# Patient Record
Sex: FEMALE | Race: WHITE | URBAN - METROPOLITAN AREA
[De-identification: names, ages, dates, MRNs, and addresses within clinical notes are randomized per-mention and may not be internally consistent; named-entity substitution may affect disease eponyms.]

---

## 2017-01-25 ENCOUNTER — TELEPHONE (OUTPATIENT)
Dept: OTHER | Facility: CLINIC | Age: 47
End: 2017-01-25

## 2017-01-25 NOTE — TELEPHONE ENCOUNTER
1/25/2017    Call Regarding [facility] [employer] [facility]    Attempt 1    Message on voicemail     Comments:         Outreach   cnt

## 2017-01-31 NOTE — TELEPHONE ENCOUNTER
Call Regarding [employer] [employer]    Attempt 1    Message     Comments: Per patient's number is [facility]'s number.      Outreach   Priya Ortiz

## 2018-03-07 ENCOUNTER — TELEPHONE (OUTPATIENT)
Dept: OTHER | Facility: CLINIC | Age: 48
End: 2018-03-07

## 2021-02-10 ENCOUNTER — OFFICE VISIT (OUTPATIENT)
Dept: URGENT CARE | Facility: URGENT CARE | Age: 51
End: 2021-02-10
Payer: COMMERCIAL

## 2021-02-10 VITALS
TEMPERATURE: 98.4 F | RESPIRATION RATE: 16 BRPM | HEART RATE: 90 BPM | DIASTOLIC BLOOD PRESSURE: 94 MMHG | WEIGHT: 164 LBS | OXYGEN SATURATION: 97 % | SYSTOLIC BLOOD PRESSURE: 166 MMHG

## 2021-02-10 DIAGNOSIS — R30.0 DYSURIA: Primary | ICD-10-CM

## 2021-02-10 DIAGNOSIS — R03.0 ELEVATED BLOOD PRESSURE READING WITHOUT DIAGNOSIS OF HYPERTENSION: ICD-10-CM

## 2021-02-10 DIAGNOSIS — N76.0 BV (BACTERIAL VAGINOSIS): ICD-10-CM

## 2021-02-10 DIAGNOSIS — B96.89 BV (BACTERIAL VAGINOSIS): ICD-10-CM

## 2021-02-10 LAB
ALBUMIN UR-MCNC: NEGATIVE MG/DL
APPEARANCE UR: CLEAR
BACTERIA #/AREA URNS HPF: ABNORMAL /HPF
BILIRUB UR QL STRIP: NEGATIVE
COLOR UR AUTO: YELLOW
GLUCOSE UR STRIP-MCNC: NEGATIVE MG/DL
HGB UR QL STRIP: ABNORMAL
KETONES UR STRIP-MCNC: NEGATIVE MG/DL
LEUKOCYTE ESTERASE UR QL STRIP: NEGATIVE
NITRATE UR QL: NEGATIVE
NON-SQ EPI CELLS #/AREA URNS LPF: ABNORMAL /LPF
PH UR STRIP: 6 PH (ref 5–7)
RBC #/AREA URNS AUTO: ABNORMAL /HPF
SOURCE: ABNORMAL
SP GR UR STRIP: >1.03 (ref 1–1.03)
SPECIMEN SOURCE: ABNORMAL
UROBILINOGEN UR STRIP-ACNC: 0.2 EU/DL (ref 0.2–1)
WBC #/AREA URNS AUTO: ABNORMAL /HPF
WET PREP SPEC: ABNORMAL

## 2021-02-10 PROCEDURE — 81001 URINALYSIS AUTO W/SCOPE: CPT | Performed by: PHYSICIAN ASSISTANT

## 2021-02-10 PROCEDURE — 87086 URINE CULTURE/COLONY COUNT: CPT | Performed by: PHYSICIAN ASSISTANT

## 2021-02-10 PROCEDURE — 87210 SMEAR WET MOUNT SALINE/INK: CPT | Performed by: PHYSICIAN ASSISTANT

## 2021-02-10 PROCEDURE — 99204 OFFICE O/P NEW MOD 45 MIN: CPT | Performed by: PHYSICIAN ASSISTANT

## 2021-02-10 RX ORDER — CIPROFLOXACIN 500 MG/1
TABLET, FILM COATED ORAL
COMMUNITY
Start: 2021-02-08

## 2021-02-10 RX ORDER — SULFAMETHOXAZOLE/TRIMETHOPRIM 800-160 MG
1 TABLET ORAL 2 TIMES DAILY
Qty: 14 TABLET | Refills: 0 | Status: SHIPPED | OUTPATIENT
Start: 2021-02-10 | End: 2021-02-17

## 2021-02-10 RX ORDER — METRONIDAZOLE 500 MG/1
500 TABLET ORAL 2 TIMES DAILY
Qty: 14 TABLET | Refills: 0 | Status: SHIPPED | OUTPATIENT
Start: 2021-02-10 | End: 2021-02-17

## 2021-02-10 ASSESSMENT — ENCOUNTER SYMPTOMS
FLANK PAIN: 0
CONSTIPATION: 0
ABDOMINAL PAIN: 0
CARDIOVASCULAR NEGATIVE: 1
HEMATOCHEZIA: 0
PALPITATIONS: 0
DIARRHEA: 0
HEARTBURN: 0
VOMITING: 0
FATIGUE: 0
FEVER: 0
RESPIRATORY NEGATIVE: 1
HEMATURIA: 0
GASTROINTESTINAL NEGATIVE: 1
FREQUENCY: 1
NAUSEA: 0
SHORTNESS OF BREATH: 0
DYSURIA: 0
CHEST TIGHTNESS: 0
COUGH: 0
CHILLS: 0
WHEEZING: 0

## 2021-02-10 ASSESSMENT — PAIN SCALES - GENERAL: PAINLEVEL: MODERATE PAIN (4)

## 2021-02-11 NOTE — PROGRESS NOTES
Keith Waldron is a 50 year old who presents for the following health issues:  HPI   Genitourinary - Female  Onset/Duration: 1week.  Was treated for UTI 3weeks ago with cipro with good relief until last week when symptoms returned.  Was given cipro again and has been on this for 2days now without any relief.  Description:   Painful urination (Dysuria): Yes           Frequency: YES  Blood in urine (Hematuria): no  Delay in urine (Hesitency): YES  Intensity: moderate  Progression of Symptoms:  same  Accompanying Signs & Symptoms:  Fever/chills: no  Flank pain: no  Nausea and vomiting: no  Vaginal symptoms: none  Abdominal/Pelvic Pain: YES, pelvic pressure  History:   History of frequent UTI s: no  History of kidney stones: no  Sexually Active: no  Possibility of pregnancy: No  Precipitating or alleviating factors: None  Therapies tried and outcome: Increase fluid intake, cipro with minimal relief     There is no problem list on file for this patient.    Current Outpatient Medications   Medication     ciprofloxacin (CIPRO) 500 MG tablet     No current facility-administered medications for this visit.       No Known Allergies    Review of Systems   Constitutional: Negative for chills, fatigue and fever.   Respiratory: Negative.  Negative for cough, chest tightness, shortness of breath and wheezing.    Cardiovascular: Negative.  Negative for chest pain, palpitations and peripheral edema.   Gastrointestinal: Negative.  Negative for abdominal pain, constipation, diarrhea, heartburn, hematochezia, nausea and vomiting.   Genitourinary: Positive for frequency, pelvic pain and urgency. Negative for dysuria, flank pain, hematuria, vaginal bleeding and vaginal discharge.   All other systems reviewed and are negative.           Objective    BP (!) 171/101   Pulse 90   Temp 98.4  F (36.9  C) (Tympanic)   Resp 16   Wt 74.4 kg (164 lb)   SpO2 97%   There is no height or weight on file to calculate BMI.  Physical  Exam  Vitals signs and nursing note reviewed.   Constitutional:       General: She is not in acute distress.     Appearance: Normal appearance. She is well-developed and normal weight. She is not ill-appearing.   Cardiovascular:      Rate and Rhythm: Normal rate and regular rhythm.      Pulses: Normal pulses.      Heart sounds: Normal heart sounds, S1 normal and S2 normal. No murmur. No friction rub. No gallop.    Pulmonary:      Effort: Pulmonary effort is normal. No accessory muscle usage or respiratory distress.      Breath sounds: Normal breath sounds and air entry. No decreased breath sounds, wheezing, rhonchi or rales.   Abdominal:      General: Abdomen is protuberant. Bowel sounds are normal.      Palpations: Abdomen is soft. There is no hepatomegaly, splenomegaly or mass.      Tenderness: There is abdominal tenderness in the suprapubic area. There is no right CVA tenderness, left CVA tenderness, guarding or rebound. Negative signs include Garay's sign, Rovsing's sign, McBurney's sign, psoas sign and obturator sign.      Hernia: No hernia is present.   Genitourinary:     Comments: Declined pelvic exam.  Skin:     General: Skin is warm and dry.   Neurological:      Mental Status: She is alert and oriented to person, place, and time.   Psychiatric:         Mood and Affect: Mood normal.         Behavior: Behavior normal.         Thought Content: Thought content normal.         Judgment: Judgment normal.       Results for orders placed or performed in visit on 02/10/21 (from the past 24 hour(s))   UA with Microscopic reflex to Culture    Specimen: Midstream Urine   Result Value Ref Range    Color Urine Yellow     Appearance Urine Clear     Glucose Urine Negative NEG^Negative mg/dL    Bilirubin Urine Negative NEG^Negative    Ketones Urine Negative NEG^Negative mg/dL    Specific Gravity Urine >1.030 1.003 - 1.035    pH Urine 6.0 5.0 - 7.0 pH    Protein Albumin Urine Negative NEG^Negative mg/dL    Urobilinogen  Urine 0.2 0.2 - 1.0 EU/dL    Nitrite Urine Negative NEG^Negative    Blood Urine Small (A) NEG^Negative    Leukocyte Esterase Urine Negative NEG^Negative    Source Midstream Urine     WBC Urine 0 - 5 OTO5^0 - 5 /HPF    RBC Urine 10-25 (A) OTO2^O - 2 /HPF    Squamous Epithelial /LPF Urine Many (A) FEW^Few /LPF    Bacteria Urine Moderate (A) NEG^Negative /HPF   Wet prep    Specimen: Vagina   Result Value Ref Range    Specimen Description Vagina     Wet Prep No Trichomonas seen     Wet Prep Few  Clue cells seen   (A)     Wet Prep No yeast seen     Wet Prep Few  WBC'S seen            Assessment/Plan:  Dysuria: H&P is suspicious for UTI and will empirically treat with bactrim DS X7days.  She has failed cipro.  Will send for UC to help guide abx treatment.  Recommend increase fluids, regular voids, proper wiping techniques and voiding after intercourse.  Educated patient on warning signs of kidney infection and to go to the ER if she develops any of these symptoms.  Recheck in clinic if symptoms worsen or if symptoms do not improve.  -     UA with Microscopic reflex to Culture  -     sulfamethoxazole-trimethoprim (BACTRIM DS) 800-160 MG tablet; Take 1 tablet by mouth 2 times daily for 7 days  -     Urine Culture Aerobic Bacterial    BV (bacterial vaginosis):  Wet prep showed clue cells present.  Will treat with metronidazole X1week.  No ETOH while on medications due to disulfuram reaction.  Avoid foreign body insertion, scented personal care products and frequent baths.   -     Wet prep  -     metroNIDAZOLE (FLAGYL) 500 MG tablet; Take 1 tablet (500 mg) by mouth 2 times daily for 7 days    Elevated blood pressure reading without diagnosis of hypertension:  Repeat BP in clinic improved.  Chivo is to follow up with Primary Care provider regarding elevated blood pressure in 1-2weeks.          Argelia Sotomayor PA-C

## 2021-02-12 LAB
BACTERIA SPEC CULT: NO GROWTH
SPECIMEN SOURCE: NORMAL

## 2022-02-18 ENCOUNTER — OFFICE VISIT (OUTPATIENT)
Dept: URGENT CARE | Facility: URGENT CARE | Age: 52
End: 2022-02-18
Payer: COMMERCIAL

## 2022-02-18 VITALS
HEART RATE: 81 BPM | WEIGHT: 166.2 LBS | SYSTOLIC BLOOD PRESSURE: 145 MMHG | TEMPERATURE: 97.9 F | DIASTOLIC BLOOD PRESSURE: 95 MMHG | OXYGEN SATURATION: 96 %

## 2022-02-18 DIAGNOSIS — R35.0 URINARY FREQUENCY: ICD-10-CM

## 2022-02-18 DIAGNOSIS — N30.00 ACUTE CYSTITIS WITHOUT HEMATURIA: ICD-10-CM

## 2022-02-18 DIAGNOSIS — R10.9 FLANK PAIN: Primary | ICD-10-CM

## 2022-02-18 LAB
ALBUMIN UR-MCNC: NEGATIVE MG/DL
APPEARANCE UR: CLEAR
BACTERIA #/AREA URNS HPF: ABNORMAL /HPF
BILIRUB UR QL STRIP: NEGATIVE
COLOR UR AUTO: YELLOW
GLUCOSE UR STRIP-MCNC: NEGATIVE MG/DL
HGB UR QL STRIP: ABNORMAL
KETONES UR STRIP-MCNC: NEGATIVE MG/DL
LEUKOCYTE ESTERASE UR QL STRIP: ABNORMAL
NITRATE UR QL: NEGATIVE
PH UR STRIP: 5.5 [PH] (ref 5–7)
RBC #/AREA URNS AUTO: ABNORMAL /HPF
SP GR UR STRIP: 1.01 (ref 1–1.03)
SQUAMOUS #/AREA URNS AUTO: ABNORMAL /LPF
UROBILINOGEN UR STRIP-ACNC: 0.2 E.U./DL
WBC #/AREA URNS AUTO: ABNORMAL /HPF

## 2022-02-18 PROCEDURE — 81001 URINALYSIS AUTO W/SCOPE: CPT | Performed by: PHYSICIAN ASSISTANT

## 2022-02-18 PROCEDURE — 87086 URINE CULTURE/COLONY COUNT: CPT | Performed by: PHYSICIAN ASSISTANT

## 2022-02-18 PROCEDURE — 99213 OFFICE O/P EST LOW 20 MIN: CPT | Performed by: PHYSICIAN ASSISTANT

## 2022-02-18 RX ORDER — NITROFURANTOIN 25; 75 MG/1; MG/1
100 CAPSULE ORAL 2 TIMES DAILY
Qty: 14 CAPSULE | Refills: 0 | Status: SHIPPED | OUTPATIENT
Start: 2022-02-18 | End: 2022-02-25

## 2022-02-18 ASSESSMENT — ENCOUNTER SYMPTOMS
ARTHRALGIAS: 0
NECK STIFFNESS: 0
PALPITATIONS: 0
WEAKNESS: 0
SORE THROAT: 0
CHILLS: 0
FREQUENCY: 1
RESPIRATORY NEGATIVE: 1
DYSURIA: 0
DIZZINESS: 0
VOMITING: 0
ALLERGIC/IMMUNOLOGIC NEGATIVE: 1
RHINORRHEA: 0
COUGH: 0
HEADACHES: 0
EYES NEGATIVE: 1
JOINT SWELLING: 0
DIARRHEA: 0
CARDIOVASCULAR NEGATIVE: 1
FEVER: 0
BACK PAIN: 0
NECK PAIN: 0
SHORTNESS OF BREATH: 0
FLANK PAIN: 1
LIGHT-HEADEDNESS: 0
WOUND: 0
ENDOCRINE NEGATIVE: 1
NAUSEA: 0
MYALGIAS: 0

## 2022-02-18 NOTE — PATIENT INSTRUCTIONS
Patient Education     Understanding Urinary Tract Infections (UTIs)   Most UTIs are caused by bacteria, but they may also be caused by viruses or fungi. Bacteria from the bowel are the most common source of infection. The infection may start because of any of the following:     Sexual activity.  During sex, bacteria can travel from the penis, vagina, or rectum into the urethra.     Bacteria outside the rectum getting into the urethra.  Bacteria on the skin outside the rectum may travel into the urethra. This is more common in women since the rectum and urethra are closer to each other than in men. Wiping from front to back after using the toilet and keeping the area clean can help prevent germs from getting to the urethra.    Blocked urine flow through the urinary tract. If urine sits too long, germs may start to grow out of control.  Parts of the urinary tract  The infection can occur in any part of the urinary tract.       The kidneys. These organs collect and store urine.    The ureters. These tubes carry urine from the kidneys to the bladder.    The bladder. This holds urine until you are ready to let it out.    The urethra. This tube carries urine from the bladder out of the body. It is shorter in women, so bacteria can move through it more easily. The urethra is longer in men, so a UTI is less likely to reach the bladder or kidneys in men.  [email or web address] last reviewed this educational content on 1/1/2020 2000-2021 [employer]. All rights reserved. This information is not intended as a substitute for professional medical care. Always follow your healthcare professional's instructions.

## 2022-02-18 NOTE — PROGRESS NOTES
Chief Complaint:    Chief Complaint   Patient presents with     Flank Pain     pt having left side pain more toward the back, 1 week ago         Medical Decision Making:    Vital signs reviewed by Floyd Leiva PA-C  BP (!) 145/95   Pulse 81   Temp 97.9  F (36.6  C) (Axillary)   Wt 75.4 kg (166 lb 3.2 oz)   SpO2 96%     Differential Diagnosis:  UTI, Pyelonephritis, kidney stone.     ASSESSMENT:     1. Flank pain    2. Urinary frequency           PLAN:     Patient is in no acute distress.  She is afebrile with stable vital signs.  No L sided CVA tenderness at this time.  Abdominal exam was benign. Low suspicion for acute pyelonephritis.  Urine discussed with patient.  Rx for Macrobid for UTI.  We will call with culture results only if resistant.  Patient instructed to follow up with PCP in 1 week if symptoms are not improving.  Sooner if symptoms worsen.  Worrisome symptoms discussed with instructions to go to the ED.  Patient verbalized understanding and agreed with this plan.    Labs:     Results for orders placed or performed in visit on 02/18/22   UA Macro with Reflex to Micro and Culture - lab collect     Status: Abnormal    Specimen: Urine, Midstream   Result Value Ref Range    Color Urine Yellow Colorless, Straw, Light Yellow, Yellow    Appearance Urine Clear Clear    Glucose Urine Negative Negative mg/dL    Bilirubin Urine Negative Negative    Ketones Urine Negative Negative mg/dL    Specific Gravity Urine 1.015 1.003 - 1.035    Blood Urine Small (A) Negative    pH Urine 5.5 5.0 - 7.0    Protein Albumin Urine Negative Negative mg/dL    Urobilinogen Urine 0.2 0.2, 1.0 E.U./dL    Nitrite Urine Negative Negative    Leukocyte Esterase Urine Moderate (A) Negative       Current Meds:    Current Outpatient Medications:      ciprofloxacin (CIPRO) 500 MG tablet, , Disp: , Rfl:     Allergies:  No Known Allergies    SUBJECTIVE    HPI: Chivo Lyon is an 51 year old female who presents for evaluation and  treatment of L sided flank pain.  Symptoms started roughly 1 week ago and comes and goes.  She has had kidney stones in the past on the R side.  She has also had some urinary frequency for the past week.  She denies any fever, chills, nausea or vomiting.      ROS:      Review of Systems   Constitutional: Negative for chills and fever.   HENT: Negative for congestion, ear pain, rhinorrhea and sore throat.    Eyes: Negative.    Respiratory: Negative.  Negative for cough and shortness of breath.    Cardiovascular: Negative.  Negative for chest pain and palpitations.   Gastrointestinal: Negative for diarrhea, nausea and vomiting.   Endocrine: Negative.    Genitourinary: Positive for flank pain and frequency. Negative for dysuria, urgency, vaginal bleeding, vaginal discharge and vaginal pain.   Musculoskeletal: Negative for arthralgias, back pain, joint swelling, myalgias, neck pain and neck stiffness.   Skin: Negative.  Negative for rash and wound.   Allergic/Immunologic: Negative.  Negative for immunocompromised state.   Neurological: Negative for dizziness, weakness, light-headedness and headaches.        Family History   No family history on file.    Social History  Social History     Socioeconomic History     Marital status: Single     Spouse name: Not on file     Number of children: Not on file     Years of education: Not on file     Highest education level: Not on file   Occupational History     Not on file   Tobacco Use     Smoking status: Never Smoker     Smokeless tobacco: Never Used   Substance and Sexual Activity     Alcohol use: Not on file     Drug use: Not on file     Sexual activity: Not on file   Other Topics Concern     Not on file   Social History Narrative     Not on file     Social Determinants of Health     Financial Resource Strain: Not on file   Food Insecurity: Not on file   Transportation Needs: Not on file   Physical Activity: Not on file   Stress: Not on file   Social Connections: Not on file    Intimate Partner Violence: Not on file   Housing Stability: Not on file        Surgical History:  No past surgical history on file.     Problem List:  There is no problem list on file for this patient.          OBJECTIVE:     Vital signs noted and reviewed by Floyd Leiva PA-C  BP (!) 145/95   Pulse 81   Temp 97.9  F (36.6  C) (Axillary)   Wt 75.4 kg (166 lb 3.2 oz)   SpO2 96%      PEFR:    Physical Exam  Vitals and nursing note reviewed.   Constitutional:       General: She is not in acute distress.     Appearance: She is well-developed. She is not ill-appearing, toxic-appearing or diaphoretic.   HENT:      Head: Normocephalic and atraumatic.      Right Ear: Tympanic membrane and external ear normal. No drainage, swelling or tenderness. Tympanic membrane is not perforated, erythematous, retracted or bulging.      Left Ear: Tympanic membrane and external ear normal. No drainage, swelling or tenderness. Tympanic membrane is not perforated, erythematous, retracted or bulging.      Nose: No mucosal edema, congestion or rhinorrhea.      Right Sinus: No maxillary sinus tenderness or frontal sinus tenderness.      Left Sinus: No maxillary sinus tenderness or frontal sinus tenderness.      Mouth/Throat:      Pharynx: No pharyngeal swelling, oropharyngeal exudate, posterior oropharyngeal erythema or uvula swelling.      Tonsils: No tonsillar abscesses.   Eyes:      Pupils: Pupils are equal, round, and reactive to light.   Neck:      Trachea: Trachea normal.   Cardiovascular:      Rate and Rhythm: Normal rate and regular rhythm.      Heart sounds: Normal heart sounds, S1 normal and S2 normal. No murmur heard.    No friction rub. No gallop.   Pulmonary:      Effort: Pulmonary effort is normal. No respiratory distress.      Breath sounds: Normal breath sounds. No decreased breath sounds, wheezing, rhonchi or rales.   Abdominal:      General: Bowel sounds are normal. There is no distension.      Palpations: Abdomen is  soft. Abdomen is not rigid. There is no mass.      Tenderness: There is no abdominal tenderness. There is no right CVA tenderness, left CVA tenderness, guarding or rebound.   Musculoskeletal:      Cervical back: Full passive range of motion without pain, normal range of motion and neck supple.   Lymphadenopathy:      Cervical: No cervical adenopathy.   Skin:     General: Skin is warm and dry.   Neurological:      Mental Status: She is alert and oriented to person, place, and time.      Cranial Nerves: No cranial nerve deficit.      Deep Tendon Reflexes: Reflexes are normal and symmetric.   Psychiatric:         Behavior: Behavior normal. Behavior is cooperative.         Thought Content: Thought content normal.         Judgment: Judgment normal.             Floyd Leiva PA-C  2/18/2022, 3:25 PM

## 2022-02-19 LAB — BACTERIA UR CULT: NORMAL
